# Patient Record
Sex: MALE | Race: WHITE | NOT HISPANIC OR LATINO | Employment: FULL TIME | URBAN - NONMETROPOLITAN AREA
[De-identification: names, ages, dates, MRNs, and addresses within clinical notes are randomized per-mention and may not be internally consistent; named-entity substitution may affect disease eponyms.]

---

## 2021-12-01 ENCOUNTER — TELEPHONE (OUTPATIENT)
Dept: SURGERY | Facility: CLINIC | Age: 46
End: 2021-12-01

## 2021-12-01 NOTE — TELEPHONE ENCOUNTER
Mary Alice Christianson has sent a referral to Dr. 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 I wasn't sure if we saw this or how to schedule. Can you please advise.

## 2021-12-01 NOTE — TELEPHONE ENCOUNTER
"I called pt, talked with his wife, she stated \"He has a knot on his forehead which does not move and they think it might be a tumor on his bone.\"  I told her that Dr. Clifford is general surgeon and he does not operate on \"bones or skull.\"  Pt's wife stated \"I will talk with Mary Alice about it and I will call you back.\"  "

## 2021-12-21 NOTE — PROGRESS NOTES
Patient: Speedy Suero    YOB: 1975    Date: 12/27/2021    Primary Care Provider: Sharath Canales MD    Chief Complaint   Patient presents with   • Mass     forehead       SUBJECTIVE:    History of present illness:  Patient is here for evaluation of a palpable mass on his forehead. Pt states he has a mass on forehead that has been there for a year. Pt states mass is not painful.    There is slight dull discomfort, worse with pressure, located in the mid aspect of the forehead, present over several months, nonradiating, nothing seems to make it better.    The following portions of the patient's history were reviewed and updated as appropriate: allergies, current medications, past family history, past medical history, past social history, past surgical history and problem list.      Review of Systems   Constitutional: Negative for chills, fever and unexpected weight change.   HENT: Negative for trouble swallowing and voice change.    Eyes: Negative for visual disturbance.   Respiratory: Negative for apnea, cough, chest tightness, shortness of breath and wheezing.    Cardiovascular: Negative for chest pain, palpitations and leg swelling.   Gastrointestinal: Negative for abdominal distention, abdominal pain, anal bleeding, blood in stool, constipation, diarrhea, nausea, rectal pain and vomiting.   Endocrine: Negative for cold intolerance and heat intolerance.   Genitourinary: Negative for difficulty urinating, dysuria, flank pain, scrotal swelling and testicular pain.   Musculoskeletal: Negative for back pain, gait problem and joint swelling.   Skin: Negative for color change, rash and wound.   Neurological: Negative for dizziness, syncope, speech difficulty, weakness, numbness and headaches.   Hematological: Negative for adenopathy. Does not bruise/bleed easily.   Psychiatric/Behavioral: Negative for confusion. The patient is not nervous/anxious.        Allergies:  No Known  "Allergies    Medications:    Current Outpatient Medications:   •  ibuprofen (ADVIL,MOTRIN) 800 MG tablet, TAKE 1 TABLET BY MOUTH 2-3 TIMES DAILY, Disp: , Rfl: 0  •  levothyroxine (SYNTHROID, LEVOTHROID) 25 MCG tablet, TAKE ONE TABLET BY MOUTH EVERY DAY IN THE MORNING ON AN EMPTY STOMACH FOR THYROID, Disp: , Rfl:   •  loratadine (CLARITIN) 10 MG tablet, TAKE 1 TABLET BY MOUTH ONCE A DAY AS DIRECTED BY YOUR PROVIDER AS NEEDED FOR ALLERGY SYMPTOMS, Disp: , Rfl:   •  ciprofloxacin (CIPRO) 500 MG tablet, take 1 tablet by mouth twice a day, Disp: , Rfl: 0    History:  Past Medical History:   Diagnosis Date   • Back pain    • Heart attack (HCC) 2005    HEART CATH WAS WNL, LABS CAME BACK SHOWING ONE, BUT NOTHING ELSE SHOWED EVIDENCE-9 YEARS AGO    • Thyroid disease    • Wears eyeglasses        Past Surgical History:   Procedure Laterality Date   • CARDIAC CATHETERIZATION  9 YEARS AGO    NO STENTS OR BLOCKAGES FOUND   • LUMBAR LAMINECTOMY DISCECTOMY DECOMPRESSION Right 8/1/2016    Procedure: LUMBAR DISCECTOMY RIGHT L5-S1;  Surgeon: Arnol Ochoa MD;  Location: [facility];  Service:        History reviewed. No pertinent family history.    Social History     Tobacco Use   • Smoking status: Former Smoker     Years: 17.00     Types: Cigarettes   • Smokeless tobacco: Current User     Types: Snuff   • Tobacco comment: 9 YEARS AGO   Substance Use Topics   • Alcohol use: No   • Drug use: No        OBJECTIVE:    Vital Signs:   Vitals:    12/27/21 0849   BP: 118/72   Pulse: 68   Temp: 97.1 °F (36.2 °C)   SpO2: 98%   Weight: 97.1 kg (214 lb)   Height: 182.9 cm (72\")       Physical Exam:   General Appearance:    Alert, cooperative, in no acute distress   Head:    Normocephalic, without obvious abnormality, atraumatic   Eyes:            Lids and lashes normal, conjunctivae and sclerae normal, no   icterus, no pallor, corneas clear, PERRLA   Ears:    Ears appear intact with no abnormalities noted   Throat:   No oral lesions, no thrush, " oral mucosa moist   Neck:   No adenopathy, supple, trachea midline, no thyromegaly, no   carotid bruit, no JVD   Lungs:     Clear to auscultation,respirations regular, even and                  unlabored    Heart:    Regular rhythm and normal rate, normal S1 and S2, no            murmur, no gallop, no rub, no click   Chest Wall:    No abnormalities observed   Abdomen:     Normal bowel sounds, no masses, no organomegaly, soft        non-tender, non-distended, no guarding, no rebound                tenderness   Extremities:   Moves all extremities well, no edema, no cyanosis, no             redness   Pulses:   Pulses palpable and equal bilaterally   Skin:   No bleeding, bruising or rash, lipoma present on the mid aspect of the forehead   Lymph nodes:   No palpable adenopathy   Neurologic:   Cranial nerves 2 - 12 grossly intact, sensation intact, DTR       present and equal bilaterally     Results Review:   I reviewed the patient's new clinical results.  I reviewed the patient's new imaging results and agree with the interpretation.  I reviewed the patient's other test results and agree with the interpretation    Review of Systems was reviewed and confirmed as accurate as documented by the MA.    ASSESSMENT/PLAN:    1. Mass of head        Patient needs to undergo local excision of this lipoma, risk and benefits of operative versus nonoperative intervention of been discussed with the patient, he understands and agrees, and wishes to proceed.    I discussed the patients findings and my recommendations with patient        Electronically signed by Lauro Clifford MD  12/30/21

## 2021-12-27 ENCOUNTER — OFFICE VISIT (OUTPATIENT)
Dept: SURGERY | Facility: CLINIC | Age: 46
End: 2021-12-27

## 2021-12-27 VITALS
DIASTOLIC BLOOD PRESSURE: 72 MMHG | BODY MASS INDEX: 28.99 KG/M2 | SYSTOLIC BLOOD PRESSURE: 118 MMHG | HEIGHT: 72 IN | HEART RATE: 68 BPM | TEMPERATURE: 97.1 F | WEIGHT: 214 LBS | OXYGEN SATURATION: 98 %

## 2021-12-27 DIAGNOSIS — R22.0 MASS OF HEAD: Primary | ICD-10-CM

## 2021-12-27 PROCEDURE — 99203 OFFICE O/P NEW LOW 30 MIN: CPT | Performed by: SURGERY

## 2021-12-27 RX ORDER — LEVOTHYROXINE SODIUM 0.03 MG/1
TABLET ORAL
COMMUNITY
Start: 2021-10-04

## 2021-12-27 RX ORDER — LORATADINE 10 MG/1
TABLET ORAL
COMMUNITY
Start: 2021-11-02

## 2021-12-30 ENCOUNTER — PROCEDURE VISIT (OUTPATIENT)
Dept: SURGERY | Facility: CLINIC | Age: 46
End: 2021-12-30

## 2021-12-30 VITALS
RESPIRATION RATE: 18 BRPM | HEIGHT: 72 IN | TEMPERATURE: 97.8 F | WEIGHT: 214 LBS | HEART RATE: 68 BPM | DIASTOLIC BLOOD PRESSURE: 72 MMHG | SYSTOLIC BLOOD PRESSURE: 120 MMHG | BODY MASS INDEX: 28.99 KG/M2 | OXYGEN SATURATION: 98 %

## 2021-12-30 DIAGNOSIS — R22.0 MASS OF HEAD: Primary | ICD-10-CM

## 2021-12-30 DIAGNOSIS — D49.2 SKIN NEOPLASM: Primary | ICD-10-CM

## 2021-12-30 DIAGNOSIS — R22.0 MASS OF HEAD: ICD-10-CM

## 2021-12-30 PROCEDURE — 21013 EXC FACE TUM DEEP < 2 CM: CPT | Performed by: SURGERY

## 2021-12-30 NOTE — PROGRESS NOTES
Location:forehead    Procedure: Excision lipoma      I recommend excision. Procedure and the risks and benefits were explained including bleeding and infection. The patient understands these and wishes to proceed.     The patient was brought to the procedure room. Consent and time out were performed. The area was prepped and draped in the usual fashion. 1% lidocaine with epinephrine was infused locally. A incision was made over the fatty tumor suspected to be a lipoma. Excision was performed. The fatty tumor size was 1.4 cm. The lipoma was located in the muscle layer. The wound was closed in layers with interrupted simple vicryl and Nylon for the skin. Wound closure size was 2 cm. There were no complications and the patient tolerated the procedure well. Hemostasis was well controlled with pressure and there was minimal blood loss. Wound instructions were given.

## 2024-01-05 ENCOUNTER — OFFICE VISIT (OUTPATIENT)
Dept: NEUROSURGERY | Facility: CLINIC | Age: 49
End: 2024-01-05
Payer: COMMERCIAL

## 2024-01-05 VITALS — WEIGHT: 217.4 LBS | TEMPERATURE: 97.1 F | HEIGHT: 71 IN | BODY MASS INDEX: 30.44 KG/M2

## 2024-01-05 DIAGNOSIS — M47.22 CERVICAL SPONDYLOSIS WITH RADICULOPATHY: Primary | ICD-10-CM

## 2024-01-05 DIAGNOSIS — M50.30 DDD (DEGENERATIVE DISC DISEASE), CERVICAL: ICD-10-CM

## 2024-01-05 PROCEDURE — 99204 OFFICE O/P NEW MOD 45 MIN: CPT | Performed by: NEUROLOGICAL SURGERY

## 2024-01-05 RX ORDER — GABAPENTIN 300 MG/1
300 CAPSULE ORAL TAKE AS DIRECTED
Qty: 90 CAPSULE | Refills: 1 | Status: SHIPPED | OUTPATIENT
Start: 2024-01-05

## 2024-01-05 NOTE — PROGRESS NOTES
Patient: Spedey Suero  : 1975    Primary Care Provider: Sharath Canales MD    Requesting Provider: As above        History    Chief Complaint: Neck and left upper extremity pain with sensory alteration.    History of Present Illness: Mr. 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  He is well-known to my service.  On 2016 he underwent right L5-S1 discectomy did fairly well.  He is chronically had some low-grade neck difficulties.  At one point he did some chiropractic on his neck for a period of about a year.  Since March he has had increased symptoms in his neck with associated headache.  He has had pain around the left elbow region.  Very transiently he had some numbness in the ulnar fingers of the left hand.  NSAIDs are helpful.  Lying down wrong is bothersome.    Review of Systems   Constitutional:  Negative for activity change, appetite change, chills, diaphoresis, fatigue, fever and unexpected weight change.   HENT:  Negative for congestion, dental problem, drooling, ear discharge, ear pain, facial swelling, hearing loss, mouth sores, nosebleeds, postnasal drip, rhinorrhea, sinus pressure, sinus pain, sneezing, sore throat, tinnitus, trouble swallowing and voice change.    Eyes:  Positive for visual disturbance. Negative for photophobia, pain, discharge, redness and itching.   Respiratory:  Negative for apnea, cough, choking, chest tightness, shortness of breath, wheezing and stridor.    Cardiovascular:  Negative for chest pain, palpitations and leg swelling.   Gastrointestinal:  Negative for abdominal distention, abdominal pain, anal bleeding, blood in stool, constipation, diarrhea, nausea, rectal pain and vomiting.   Endocrine: Negative for cold intolerance, heat intolerance, polydipsia, polyphagia and polyuria.   Genitourinary:  Negative for decreased urine volume, difficulty urinating, dysuria, enuresis, flank pain, frequency, genital sores, hematuria and urgency.  "  Musculoskeletal:  Positive for neck stiffness. Negative for arthralgias, back pain, gait problem, joint swelling, myalgias and neck pain.   Skin:  Negative for color change, pallor, rash and wound.   Allergic/Immunologic: Negative for environmental allergies, food allergies and immunocompromised state.   Neurological:  Positive for numbness and headaches. Negative for dizziness, tremors, seizures, syncope, facial asymmetry, speech difficulty, weakness and light-headedness.   Hematological:  Negative for adenopathy. Does not bruise/bleed easily.   Psychiatric/Behavioral:  Negative for agitation, behavioral problems, confusion, decreased concentration, dysphoric mood, hallucinations, self-injury, sleep disturbance and suicidal ideas. The patient is not nervous/anxious and is not hyperactive.        The patient's past medical history, past surgical history, family history, and social history have been reviewed at length in the electronic medical record.      Physical Exam:   Temp 97.1 °F (36.2 °C) (Temporal)   Ht 180.3 cm (71\")   Wt 98.6 kg (217 lb 6.4 oz)   BMI 30.32 kg/m²   CONSTITUTIONAL: Patient is well-nourished, pleasant and appears stated age.  MUSCULOSKELETAL:  Neck tenderness to palpation is not observed.   ROM in the neck is normal.  However, extending his neck induces more symptoms.  NEUROLOGICAL:  Orientation, memory, attention span, language function, and cognition have been examined and are intact.  Strength is intact in the upper and lower extremities to direct testing.  Muscle tone is normal throughout.  Station and gait are normal.  Sensation is intact to light touch testing throughout.  Deep tendon reflexes are 1+ and symmetrical.  Arturo's Sign is negative bilaterally. No clonus is elicited.  Coordination is intact.      Medical Decision Making    Data Review:   (All imaging studies were personally reviewed unless stated otherwise)  MRI of the cervical spine dated 12/1/2023 demonstrates some " degenerative disc disease and spondylosis.  There is some broad-based spurring that narrows the recesses at C3-4 and C5-6.  Or compromise is not observed.    Diagnosis:   1.  Mechanical neck pain.  2.  Cervical spondylosis with possible low-grade radiculopathy.    Treatment Options:   I have referred the patient for physical therapy that will include traction.  I have prescribed gabapentin.  He will continue his NSAID.  He will follow-up in our clinic in several weeks to check on his progress.  If difficulties persist then I would probably obtain electrodiagnostic studies of his left upper extremity.      Scribed for Arnol Ochoa MD by Sierra Webber CMA on 1/5/2024 10:33 EST       I, Dr. Ochoa, personally performed the services described in the documentation, as scribed in my presence, and it is both accurate and complete.

## 2025-08-27 ENCOUNTER — OFFICE VISIT (OUTPATIENT)
Dept: SURGERY | Facility: CLINIC | Age: 50
End: 2025-08-27
Payer: COMMERCIAL

## 2025-08-27 VITALS
TEMPERATURE: 97.8 F | OXYGEN SATURATION: 100 % | WEIGHT: 198 LBS | HEIGHT: 71 IN | DIASTOLIC BLOOD PRESSURE: 76 MMHG | HEART RATE: 79 BPM | SYSTOLIC BLOOD PRESSURE: 124 MMHG | BODY MASS INDEX: 27.72 KG/M2

## 2025-08-27 DIAGNOSIS — R22.0 HEAD MASS: Primary | ICD-10-CM

## 2025-08-27 RX ORDER — PREDNISONE 10 MG/1
TABLET ORAL
COMMUNITY
Start: 2025-07-02

## 2025-08-27 RX ORDER — HYDROXYCHLOROQUINE SULFATE 200 MG/1
TABLET, FILM COATED ORAL
COMMUNITY
Start: 2025-08-09